# Patient Record
Sex: FEMALE | Race: WHITE | Employment: UNEMPLOYED | URBAN - METROPOLITAN AREA
[De-identification: names, ages, dates, MRNs, and addresses within clinical notes are randomized per-mention and may not be internally consistent; named-entity substitution may affect disease eponyms.]

---

## 2017-05-15 ENCOUNTER — OFFICE VISIT (OUTPATIENT)
Dept: CARDIOLOGY CLINIC | Age: 62
End: 2017-05-15

## 2017-05-15 ENCOUNTER — PROCEDURE VISIT (OUTPATIENT)
Dept: CARDIOLOGY CLINIC | Age: 62
End: 2017-05-15

## 2017-05-15 VITALS
SYSTOLIC BLOOD PRESSURE: 130 MMHG | BODY MASS INDEX: 33.66 KG/M2 | HEIGHT: 63 IN | WEIGHT: 190 LBS | DIASTOLIC BLOOD PRESSURE: 70 MMHG | HEART RATE: 70 BPM

## 2017-05-15 DIAGNOSIS — R55 SYNCOPE, UNSPECIFIED SYNCOPE TYPE: Primary | ICD-10-CM

## 2017-05-15 DIAGNOSIS — Z95.0 PACEMAKER: Chronic | ICD-10-CM

## 2017-05-15 DIAGNOSIS — I49.5 SINOATRIAL NODE DYSFUNCTION (HCC): ICD-10-CM

## 2017-05-15 DIAGNOSIS — R00.2 PALPITATIONS: ICD-10-CM

## 2017-05-15 PROCEDURE — 99213 OFFICE O/P EST LOW 20 MIN: CPT | Performed by: INTERNAL MEDICINE

## 2017-05-15 PROCEDURE — 93280 PM DEVICE PROGR EVAL DUAL: CPT | Performed by: INTERNAL MEDICINE

## 2017-10-11 ENCOUNTER — OFFICE VISIT (OUTPATIENT)
Dept: CARDIOLOGY CLINIC | Age: 62
End: 2017-10-11

## 2017-10-11 ENCOUNTER — PROCEDURE VISIT (OUTPATIENT)
Dept: CARDIOLOGY CLINIC | Age: 62
End: 2017-10-11

## 2017-10-11 VITALS — DIASTOLIC BLOOD PRESSURE: 74 MMHG | HEIGHT: 63 IN | HEART RATE: 66 BPM | SYSTOLIC BLOOD PRESSURE: 120 MMHG

## 2017-10-11 DIAGNOSIS — R00.2 PALPITATIONS: ICD-10-CM

## 2017-10-11 DIAGNOSIS — I49.5 SINOATRIAL NODE DYSFUNCTION (HCC): ICD-10-CM

## 2017-10-11 DIAGNOSIS — R06.02 SHORTNESS OF BREATH: ICD-10-CM

## 2017-10-11 DIAGNOSIS — Z95.0 PACEMAKER: Chronic | ICD-10-CM

## 2017-10-11 DIAGNOSIS — R07.9 CHEST PAIN, UNSPECIFIED TYPE: ICD-10-CM

## 2017-10-11 DIAGNOSIS — I49.5 SINOATRIAL NODE DYSFUNCTION (HCC): Primary | ICD-10-CM

## 2017-10-11 DIAGNOSIS — R55 SYNCOPE, UNSPECIFIED SYNCOPE TYPE: Chronic | ICD-10-CM

## 2017-10-11 PROCEDURE — 99215 OFFICE O/P EST HI 40 MIN: CPT | Performed by: INTERNAL MEDICINE

## 2017-10-11 PROCEDURE — 93280 PM DEVICE PROGR EVAL DUAL: CPT | Performed by: INTERNAL MEDICINE

## 2017-10-11 NOTE — PROGRESS NOTES
[facility]   Cardiac Follow up    Referring Provider:  Blayne Garcia MD     Chief Complaint   Patient presents with    Palpitations     C/o heart racing and dizziness    Shortness of Breath     and chest pain       HPI:  Lizet Maya is a 63 y.o. female presents acutely for chest pain and SOB. She previously was followed for recurrent vasovagal syncope and functioning DDD pacer which was implanted 5/10/2002 for sinus node dysfunction and replaced later. Today she denies recent syncopal episode. Her main concern is of shortness of breath and chest pressure. She feels like her heart has a cold and breathing in makes chest pressure worse. Symptoms started six weeks ago coupled with worsening anemia since June. Symptoms occur with activity, but she pushes on and her symptoms resolve about 50% of the time. She has lower extremity edema which is constant. She has a history of iron deficiency anemia and gets IV iron infusions in the [employer], source unknown and last Hbg in 8 range she reports. Interrogation today shows normal function. .. Past Medical History:   has a past medical history of Syncope and collapse. DVT on anticoagulation   Surgical History:   has a past surgical history that includes Diagnostic Cardiac Cath Lab Procedure (4/04) and Cardiac pacemaker placement (5/02). Social History:   reports that she has never smoked. She has never used smokeless tobacco. She reports that she does not drink alcohol or use drugs. Family History:  family history includes Heart Disease in her father; Kidney Disease in her mother. Home Medications:  Outpatient Encounter Prescriptions as of 10/11/2017   Medication Sig Dispense Refill    Lubiprostone (AMITIZA PO) Take 40 mg by mouth 2 times daily      warfarin (COUMADIN) 10 MG tablet Take 10 mg by mouth daily As directed      Multiple Vitamin (MULTIVITAMINS PO) Take 1 tablet by mouth daily.         done  Assessment:  1. Syncope, unspecified syncope type    2. Sinoatrial node dysfunction (HCC) - dual chamber pacemaker implanted 12/5/11   3. Pacemaker - normal function today   4. Chest pain/shortness of breath: Arvell Hardy Sleep apnea a possibility and discussed benefits of knowing. She will consider. Plan:   1. No change in medications  2. Schedule stress [facility] at [facility] which will hopefully be negative. 2.  Follow up in May 2018 as previously scheduled. Reginald Nagy M.D.

## 2017-10-18 ENCOUNTER — TELEPHONE (OUTPATIENT)
Dept: CARDIOLOGY CLINIC | Age: 62
End: 2017-10-18

## 2017-10-23 NOTE — TELEPHONE ENCOUNTER
Called and reviewed with patient who will seek cardiologist to review echo findings and decide whether other testing needed. A CTA of cors would be a consideration if needed.

## 2018-04-13 ENCOUNTER — TELEPHONE (OUTPATIENT)
Dept: CARDIOLOGY CLINIC | Age: 63
End: 2018-04-13

## 2018-04-13 ENCOUNTER — OFFICE VISIT (OUTPATIENT)
Dept: CARDIOLOGY CLINIC | Age: 63
End: 2018-04-13

## 2018-04-13 VITALS
SYSTOLIC BLOOD PRESSURE: 152 MMHG | HEART RATE: 62 BPM | DIASTOLIC BLOOD PRESSURE: 100 MMHG | BODY MASS INDEX: 33.84 KG/M2 | WEIGHT: 191 LBS | HEIGHT: 63 IN

## 2018-04-13 DIAGNOSIS — Z95.0 PACEMAKER: Primary | ICD-10-CM

## 2018-04-13 DIAGNOSIS — I49.5 SINOATRIAL NODE DYSFUNCTION (HCC): ICD-10-CM

## 2018-04-13 DIAGNOSIS — I10 ESSENTIAL HYPERTENSION: ICD-10-CM

## 2018-04-13 PROCEDURE — 99213 OFFICE O/P EST LOW 20 MIN: CPT | Performed by: INTERNAL MEDICINE

## 2018-04-13 PROCEDURE — 93280 PM DEVICE PROGR EVAL DUAL: CPT | Performed by: INTERNAL MEDICINE

## 2018-07-09 ENCOUNTER — TELEPHONE (OUTPATIENT)
Dept: CARDIOLOGY CLINIC | Age: 63
End: 2018-07-09

## 2018-07-09 NOTE — TELEPHONE ENCOUNTER
like to speak with the Dr that is covering for [facility] about pt and surgery that she is having soon. Please call at [phone]. Thank you.

## 2018-07-10 ENCOUNTER — TELEPHONE (OUTPATIENT)
Dept: CARDIOLOGY CLINIC | Age: 63
End: 2018-07-10

## 2018-07-10 NOTE — TELEPHONE ENCOUNTER
I spoke with Dr. Niecy Marlow per Dr. Oli Buchanan. He reviewed recent stress test and okay to proceed with cysto and possible biopsy. I spoke with nurse at requesting clearance doctor, Dr. Tami Dong PCP and she states that per Dr. Tatiana Castle he spoke with Dr. Oli Buchanan from our office last night and he is good with okay to proceed with the cysto. Nothing further to be done at this time according to his nurse. Instructed to call with any other concerns.

## 2018-07-11 ENCOUNTER — TELEPHONE (OUTPATIENT)
Dept: CARDIOLOGY CLINIC | Age: 63
End: 2018-07-11

## 2018-07-11 NOTE — TELEPHONE ENCOUNTER
Rio Parker from PAT dept at [facility] called asking for a \"clearance\" letter for patient having a Cysto with bilateral retrograde pyelogram by Dr. Cheyenne Rader on 7/13/18. MAC anesthesia will be given, length of procedure 30 minutes. See telephone encounters from 7/9/18 and 7/10/18 regarding this issue. Please fax letter to [address] [place] [address] Sjp [phone].

## 2018-07-11 NOTE — LETTER
[facility] CARDIOLOGY[address]  Dept: [phone]  Dept Fax: [phone]      2018    Fabricio Osorio  : 1955  DOS: 2018    To Dr. Ari Riley:    Norina Runner has been evaluated for cardiac clearance. She is considered at a low cardiac risk for her upcoming urology procedures. Please let my office know if you have any questions or concerns.             Allison Mendez MD, [facility] [place] and [place]

## 2018-09-10 ENCOUNTER — TELEPHONE (OUTPATIENT)
Dept: CARDIOLOGY CLINIC | Age: 63
End: 2018-09-10

## 2018-09-10 ENCOUNTER — OFFICE VISIT (OUTPATIENT)
Dept: CARDIOLOGY CLINIC | Age: 63
End: 2018-09-10

## 2018-09-10 ENCOUNTER — PROCEDURE VISIT (OUTPATIENT)
Dept: CARDIOLOGY CLINIC | Age: 63
End: 2018-09-10

## 2018-09-10 VITALS
SYSTOLIC BLOOD PRESSURE: 130 MMHG | DIASTOLIC BLOOD PRESSURE: 80 MMHG | HEART RATE: 68 BPM | BODY MASS INDEX: 33.66 KG/M2 | WEIGHT: 190 LBS | HEIGHT: 63 IN

## 2018-09-10 DIAGNOSIS — Z95.0 PACEMAKER: Chronic | ICD-10-CM

## 2018-09-10 DIAGNOSIS — I49.5 SINOATRIAL NODE DYSFUNCTION (HCC): ICD-10-CM

## 2018-09-10 DIAGNOSIS — I10 ESSENTIAL HYPERTENSION: Primary | ICD-10-CM

## 2018-09-10 PROCEDURE — 99214 OFFICE O/P EST MOD 30 MIN: CPT | Performed by: INTERNAL MEDICINE

## 2018-09-10 PROCEDURE — 93280 PM DEVICE PROGR EVAL DUAL: CPT | Performed by: INTERNAL MEDICINE

## 2018-09-10 NOTE — PATIENT INSTRUCTIONS
Patient Education        Heart-Healthy Diet: Care Instructions  Your Care Instructions    A heart-healthy diet has lots of vegetables, fruits, nuts, beans, and whole grains, and is low in salt. It limits foods that are high in saturated fat, such as meats, cheeses, and fried foods. It may be hard to change your diet, but even small changes can lower your risk of heart attack and heart disease. Follow-up care is a key part of your treatment and safety. Be sure to make and go to all appointments, and call your doctor if you are having problems. It's also a good idea to know your test results and keep a list of the medicines you take. How can you care for yourself at home? Watch your portions  · Learn what a serving is. A \"serving\" and a \"portion\" are not always the same thing. Make sure that you are not eating larger portions than are recommended. For example, a serving of pasta is ½ cup. A serving size of meat is 2 to 3 ounces. A 3-ounce serving is about the size of a deck of cards. Measure serving sizes until you are good at Griffith" them. Keep in mind that restaurants often serve portions that are 2 or 3 times the size of one serving. · To keep your energy level up and keep you from feeling hungry, eat often but in smaller portions. · Eat only the number of calories you need to stay at a healthy weight. If you need to lose weight, eat fewer calories than your body burns (through exercise and other physical activity). Eat more fruits and vegetables  · Eat a variety of fruit and vegetables every day. Dark green, deep orange, red, or yellow fruits and vegetables are especially good for you. Examples include spinach, carrots, peaches, and berries. · Keep carrots, celery, and other veggies handy for snacks. Buy fruit that is in season and store it where you can see it so that you will be tempted to eat it. · Cook dishes that have a lot of veggies in them, such as stir-fries and soups.   Limit saturated and Private.Me, and be sure to contact your doctor if:    · You would like help planning heart-healthy meals. Where can you learn more? Go to https://[email or web address] org and sign in to your Pretty in [employer] Pocket (PRIMP) account. Enter V137 in the [employer] box to learn more about \"Heart-Healthy Diet: Care Instructions. \"     If you do not have an account, please click on the \"Sign Up Now\" link. Current as of: December 6, 2017  Content Version: 11.7  © 2724-3993 [employer]. Care instructions adapted under license by [facility] ([facility]). If you have questions about a medical condition or this instruction, always ask your healthcare professional. [address] any warranty or liability for your use of this information.

## 2018-09-10 NOTE — PROGRESS NOTES
60 tablet 5    Lubiprostone (AMITIZA PO) Take 40 mg by mouth 2 times daily      warfarin (COUMADIN) 10 MG tablet Take 10 mg by mouth daily As directed      Multiple Vitamin (MULTIVITAMINS PO) Take 1 tablet by mouth daily.  Cyanocobalamin (VITAMIN B 12) 100 MCG LOZG Take by mouth daily      Cyanocobalamin (VITAMIN B-12 IJ) Inject 1,000 mcg/mL as directed Every 3 days      pantoprazole (PROTONIX) 40 MG tablet Take 40 mg by mouth 2 times daily       potassium chloride SA (K-DUR;KLOR-CON) 20 MEQ tablet Take 20 mEq by mouth 5 days a week       No facility-administered encounter medications on file as of 9/10/2018. Allergies:  Latex; Biaxin [clarithromycin]; Aspirin; Erythromycin; Pcn [penicillins]; and Sulfa antibiotics     Review of Systems   Constitutional: Negative for activity change and appetite change. HENT: Negative for facial swelling and neck pain. Eyes: Negative for discharge and itching. Respiratory:  positive for shortness of breath.  +snores  Cardiovascular: Positive for heart fluttering  Gastrointestinal: Negative for abdominal pain and abdominal distention. Genitourinary: Negative. Musculoskeletal: Negative. Skin: Negative for color change and pallor. Neurological: Negative for dizziness,  light-headedness, syncope   Hematological: Negative. Psychiatric/Behavioral: Negative for behavioral problems and agitation. /80 (Site: Left Upper Arm, Position: Sitting, Cuff Size: Large Adult)   Pulse 68   Ht 5' 3\" (1.6 m)   Wt 190 lb (86.2 kg)   BMI 33.66 kg/m²       Objective:  Physical Exam   Nursing note and vitals reviewed. Constitutional: She appears well-developed and well-nourished. heavy  Head:  atraumatic. Eyes: Right eye exhibits no discharge. Left eye exhibits no discharge. Neck: Neck supple. Cardiovascular: Normal rate, regular rhythm and normal heart sounds without murmur, gallop, or rub.    Pulmonary/Chest: Effort normal and breath sounds normal.   Abdominal: Soft. Musculoskeletal: She has no edema   Neurological: She is alert without any gross abnormalities. Skin: Skin is warm and dry. Psychiatric: She has a normal mood and affect. Left pectoral site intact    ECG today: 7/9/2018  Not done today, At becik: SR with ventricular bigiminy 78bpm    Echo 10/2017  Left ventricle cavity size ws normal, mild concentric hypertrophy. Systolic functions normal. Calculated EF 55-60%. Wall motion normal.   Mitral valve mild regurgitation  Right ventricle cavity size mildly dilated    Stress ECG conclusions: no stress arrhythmias or conduction abnormalities. Negative for ischemia. Exercise time 6 mins, limited by angina, resulting score is -2. Predicts moderate risk for stress-induced ischemia. Stress Echo findings positive for stress-induced ischemia    Impression: abnormal study after maximal exercise with reproduction of symptoms and possible anterior wall motion abnormality. Sensitivity of this study was limited due to submaximal stress. Target HR of 136 was not achieved. Assessment:    Abnormal stress test  Cardiac CTA ordered to be completed at [facility] per pt request    Pacemaker in situ - dual chamber 12/5/2011  No episodes of presyncope or syncope since last visit  Pacer assessment revealed normal function and most of higher rates were related to Rate responsiveness. She was reassured that her device was working well and rates were easily explained. SA node dysfuncation  s/p pacemaker 2011    Hypertension  Does monitor BP at home, no BP meds taken, she reports it is well controlled  Discussed option of tenormin or metoprolol for HTN  Pt states her BP at HS drops as low as 72 systolic, likely vasovagal cause  Start Toprol XL 25mg daily if BP remains high, she may use BB for CTA  Sleep apnea a possibility and discussed benefits of knowing. She will consider.      Plan:   Cardiac CTA ordered, Lopressor 25mg x2 doses ordered for CTA  Continue BP

## 2018-09-11 LAB
BUN BLDV-MCNC: 14 MG/DL (ref 7–25)
CREAT SERPL-MCNC: 0.7 MG/DL (ref 0.5–1.2)

## 2019-01-25 ENCOUNTER — TELEPHONE (OUTPATIENT)
Dept: CARDIOLOGY CLINIC | Age: 64
End: 2019-01-25

## 2019-01-28 ENCOUNTER — PROCEDURE VISIT (OUTPATIENT)
Dept: CARDIOLOGY CLINIC | Age: 64
End: 2019-01-28

## 2019-01-28 DIAGNOSIS — Z95.0 PACEMAKER: Chronic | ICD-10-CM

## 2019-01-28 DIAGNOSIS — I49.5 SINOATRIAL NODE DYSFUNCTION (HCC): ICD-10-CM

## 2019-01-28 PROBLEM — I82.409 DVT (DEEP VENOUS THROMBOSIS) (HCC): Status: ACTIVE | Noted: 2019-01-28

## 2019-04-09 ENCOUNTER — TELEPHONE (OUTPATIENT)
Dept: CARDIOLOGY CLINIC | Age: 64
End: 2019-04-09

## 2019-04-09 NOTE — TELEPHONE ENCOUNTER
Patient wants Dr. Julita Moffett to view EKG under media tab done at [facility]. Pt is in bigeminy and apparently stays in it. She states they put her on Metoprolol but no change. Having CTA this Thursday and wants Dr. Julita Moffett to know about her bigeminy before has procedure. If you have any thoughts or concerns please let pt know.

## 2019-04-10 NOTE — TELEPHONE ENCOUNTER
Called patient to let her know that Dr. Nyla Moffett agrees with having CTA. She will call if anymore questions.

## 2019-04-15 ENCOUNTER — TELEPHONE (OUTPATIENT)
Dept: CARDIOLOGY CLINIC | Age: 64
End: 2019-04-15

## 2019-05-07 ENCOUNTER — TELEPHONE (OUTPATIENT)
Dept: CARDIOLOGY CLINIC | Age: 64
End: 2019-05-07

## 2019-05-07 ENCOUNTER — PROCEDURE VISIT (OUTPATIENT)
Dept: CARDIOLOGY CLINIC | Age: 64
End: 2019-05-07
Payer: COMMERCIAL

## 2019-05-07 DIAGNOSIS — Z95.0 PACEMAKER: Chronic | ICD-10-CM

## 2019-05-07 DIAGNOSIS — I49.5 SINOATRIAL NODE DYSFUNCTION (HCC): ICD-10-CM

## 2019-05-07 PROCEDURE — 93280 PM DEVICE PROGR EVAL DUAL: CPT | Performed by: INTERNAL MEDICINE

## 2019-05-07 NOTE — TELEPHONE ENCOUNTER
Dr Sloane Berg would like a copy of the Pacemaker Interrogation from 05/07/19 faxed to [phone] Attn; Maye Cabrera call to advise Thank you

## 2019-10-11 ENCOUNTER — NURSE ONLY (OUTPATIENT)
Dept: CARDIOLOGY CLINIC | Age: 64
End: 2019-10-11
Payer: COMMERCIAL

## 2019-10-11 ENCOUNTER — OFFICE VISIT (OUTPATIENT)
Dept: CARDIOLOGY CLINIC | Age: 64
End: 2019-10-11
Payer: COMMERCIAL

## 2019-10-11 VITALS
BODY MASS INDEX: 33.97 KG/M2 | HEIGHT: 64 IN | WEIGHT: 199 LBS | SYSTOLIC BLOOD PRESSURE: 110 MMHG | DIASTOLIC BLOOD PRESSURE: 70 MMHG | OXYGEN SATURATION: 99 % | HEART RATE: 60 BPM

## 2019-10-11 DIAGNOSIS — I49.3 PVC'S (PREMATURE VENTRICULAR CONTRACTIONS): ICD-10-CM

## 2019-10-11 DIAGNOSIS — Z95.0 PACEMAKER: Chronic | ICD-10-CM

## 2019-10-11 DIAGNOSIS — I49.5 SINOATRIAL NODE DYSFUNCTION (HCC): ICD-10-CM

## 2019-10-11 DIAGNOSIS — I10 ESSENTIAL HYPERTENSION: ICD-10-CM

## 2019-10-11 DIAGNOSIS — G47.33 OSA (OBSTRUCTIVE SLEEP APNEA): ICD-10-CM

## 2019-10-11 DIAGNOSIS — Z95.0 CARDIAC PACEMAKER IN SITU: Primary | ICD-10-CM

## 2019-10-11 PROCEDURE — 99214 OFFICE O/P EST MOD 30 MIN: CPT | Performed by: INTERNAL MEDICINE

## 2019-10-11 PROCEDURE — 93280 PM DEVICE PROGR EVAL DUAL: CPT | Performed by: INTERNAL MEDICINE

## 2019-10-25 ENCOUNTER — TELEPHONE (OUTPATIENT)
Dept: CARDIOLOGY CLINIC | Age: 64
End: 2019-10-25

## 2020-01-27 NOTE — PROGRESS NOTES
(AMITIZA PO) Take 40 mg by mouth 2 times daily      warfarin (COUMADIN) 10 MG tablet Take 10 mg by mouth daily As directed      Multiple Vitamin (MULTIVITAMINS PO) Take 1 tablet by mouth daily.  Cyanocobalamin (VITAMIN B 12) 100 MCG LOZG Take by mouth daily      Cyanocobalamin (VITAMIN B-12 IJ) Inject 1,000 mcg/mL as directed Every 3 days      pantoprazole (PROTONIX) 40 MG tablet Take 40 mg by mouth 2 times daily       potassium chloride SA (K-DUR;KLOR-CON) 20 MEQ tablet Take 20 mEq by mouth 5 days a week       No facility-administered encounter medications on file as of 1/28/2020. Allergies:  Latex; Biaxin [clarithromycin]; Aspirin; Erythromycin; Pcn [penicillins]; and Sulfa antibiotics     Review of Systems   Constitutional: Negative for activity change and appetite change. HENT: Negative for facial swelling and neck pain. Eyes: Negative for discharge and itching. Respiratory:  positive for shortness of breath.  +snores wearing c-pap now  Cardiovascular: Positive for heart fluttering  Gastrointestinal: Negative for abdominal pain and abdominal distention. Genitourinary: Negative. Musculoskeletal: Negative. Skin: Negative for color change and pallor. Neurological: Negative for dizziness,  light-headedness, syncope   Hematological: Negative. Psychiatric/Behavioral: Negative for behavioral problems and agitation. /70 (Site: Right Upper Arm, Position: Sitting)   Ht 5' 3\" (1.6 m)   BMI 35.25 kg/m²       Objective:  Physical Exam   Nursing note and vitals reviewed. Constitutional: She appears well-developed and well-nourished. heavy  Head:  atraumatic. Eyes: Right eye exhibits no discharge. Left eye exhibits no discharge. Neck: Neck supple. Cardiovascular: Normal rate, regular rhythm and 2/6 systolic murmur, without gallop, or rub. Pulmonary/Chest: Effort normal and breath sounds normal.   Abdominal: Soft.   Musculoskeletal: She has no edema   Neurological: She is alert without any gross abnormalities. Skin: Skin is warm and dry. Psychiatric: She has a normal mood and affect. Left pectoral site intact    ECG today: 7/9/2018  Not done today, At becki: SR with ventricular bigiminy 78bpm    Echo 10/2017  Left ventricle cavity size ws normal, mild concentric hypertrophy. Systolic functions normal. Calculated EF 55-60%. Wall motion normal.   Mitral valve mild regurgitation  Right ventricle cavity size mildly dilated    Stress ECG conclusions: no stress arrhythmias or conduction abnormalities. Negative for ischemia. Exercise time 6 mins, limited by angina, resulting score is -2. Predicts moderate risk for stress-induced ischemia. Stress Echo findings positive for stress-induced ischemia    Impression: abnormal study after maximal exercise with reproduction of symptoms and possible anterior wall motion abnormality. Sensitivity of this study was limited due to submaximal stress. Target HR of 136 was not achieved. Echo 4/14/19 ([facility])  Left ventricle: The cavity size was normal. Wall thickness was  normal. Systolic function was normal. The calculated ejection  fraction was in the range of 64% to 68%. Stroke volume: 109ml. Stroke volume/bsa: 57ml/m^2. Aortic valve: There was mild regurgitation. Mean gradient (S):  8mm Hg. Peak gradient (S): 17mm Hg. Valve area (VTI): 2.36cm^2. Indexed valve area (VTI): 1.24cm^2/m^2. Valve area (Vmean):  2.15cm^2. Mitral valve: The annulus was mildly calcified. Tricuspid valve: Regurgitant peak velocity: 343cm/sec. Peak RV-RA  gradient (S): 47mm Hg. Inferior vena cava: The vessel was normal in size; the  respirophasic diameter changes were in the normal range (>= 50%);  findings are consistent with normal central venous pressure. Pulmonary arteries: PA peak pressure: 50mm Hg (S).   It should be noted that ventricular bigeminy is present  throughout the study; this will affect pressure estimates as well  as assessment of ventricular function. [facility]  Coronary angiography    Dominance right   Left Main Bifurcates into the LAD and LCX. No angiographic   evidence of disease. Left Anterior Descending Consists of three diagonal branches. No   angiographic evidence of disease. Left Circumflex Consists of one major OMB. No angiographic   evidence of disease. Right Dominant artery that terminates in R-PDA and R-PLV   branches.  No angiographic evidence of disease. Negative stress echo 10/17/17    Assessment:    Abnormal stress test  [facility] 4/15/19 at [place] [address] revealed normal coronaries    Pacemaker in situ - dual chamber 12/5/2011  No further syncopal episodes  Pacer assessment revealed normal function and most of higher rates were related to Rate responsiveness. Briefly discussed decreasing rate. SA node dysfuncation  s/p pacemaker 2011    Hypertension  Blood pressure is stable    PVCs   Number of PVC's have decreased since the last interrogation. If PVC's increase would consider repeat echocardiogram.  Last few months by pacer ~ 3,500 daily    CIRILO  Reports compliance with Cpap. Need to follow up on ef, and AV which thickened, pvcs, and increased pulmonary pressures which should improve with c-pap. She follows with Dr. Venancio Harada. Plan:   1. Follow up in 1 year. Haseeb Nagy M.D. This note was scribed in the presence of Dr Chris Moran, by Jaqui Das RN  The scribes documentation has been prepared under my direction and personally reviewed by me in its entirety. I confirm that the note above accurately reflects all work, treatment, procedures, and medical decision making performed by me. Lauren Munguia

## 2020-01-28 ENCOUNTER — NURSE ONLY (OUTPATIENT)
Dept: CARDIOLOGY CLINIC | Age: 65
End: 2020-01-28
Payer: COMMERCIAL

## 2020-01-28 ENCOUNTER — OFFICE VISIT (OUTPATIENT)
Dept: CARDIOLOGY CLINIC | Age: 65
End: 2020-01-28
Payer: COMMERCIAL

## 2020-01-28 VITALS — BODY MASS INDEX: 35.25 KG/M2 | HEIGHT: 63 IN | SYSTOLIC BLOOD PRESSURE: 122 MMHG | DIASTOLIC BLOOD PRESSURE: 70 MMHG

## 2020-01-28 PROCEDURE — 99213 OFFICE O/P EST LOW 20 MIN: CPT | Performed by: INTERNAL MEDICINE

## 2020-01-28 PROCEDURE — 93280 PM DEVICE PROGR EVAL DUAL: CPT | Performed by: INTERNAL MEDICINE

## 2020-01-28 NOTE — PATIENT INSTRUCTIONS
sodium\" may still have too much sodium. Be sure to read the label to see how much sodium you are getting. · Buy fresh vegetables, or frozen vegetables without added sauces. Buy low-sodium versions of canned vegetables, soups, and other canned goods. Prepare low-sodium meals  · Cut back on the amount of salt you use in cooking. This will help you adjust to the taste. Do not add salt after cooking. One teaspoon of salt has about 2,300 mg of sodium. · Take the salt shaker off the table. · Flavor your food with garlic, lemon juice, onion, vinegar, herbs, and spices. Do not use soy sauce, lite soy sauce, steak sauce, onion salt, garlic salt, celery salt, mustard, or ketchup on your food. · Use low-sodium salad dressings, sauces, and ketchup. Or make your own salad dressings and sauces without adding salt. · Use less salt (or none) when recipes call for it. You can often use half the salt a recipe calls for without losing flavor. Other foods such as rice, pasta, and grains do not need added salt. · Rinse canned vegetables, and cook them in fresh water. This removes some--but not all--of the salt. · Avoid water that is naturally high in sodium or that has been treated with water softeners, which add sodium. Call your local water company to find out the sodium content of your water supply. If you buy bottled water, read the label and choose a sodium-free brand. Avoid high-sodium foods  · Avoid eating:  ? Smoked, cured, salted, and canned meat, fish, and poultry. ? Ham, back, hot dogs, and luncheon meats. ? Regular, hard, and processed cheese and regular peanut butter. ? Crackers with salted tops, and other salted snack foods such as pretzels, chips, and salted popcorn. ? Frozen prepared meals, unless labeled low-sodium. ? Canned and dried soups, broths, and bouillon, unless labeled sodium-free or low-sodium. ? Canned vegetables, unless labeled sodium-free or low-sodium. ?  [place]min fries, pizza, tacos, and other fast foods. ? Pickles, olives, ketchup, and other condiments, especially soy sauce, unless labeled sodium-free or low-sodium. Where can you learn more? Go to https://Lynx Laboratorieshipolitoeb.Curious Sense. org and sign in to your [employer] account. Enter E585 in the [facility] box to learn more about \"Low Sodium Diet (2,000 Milligram): Care Instructions. \"     If you do not have an account, please click on the \"Sign Up Now\" link. Current as of: August 21, 2019  Content Version: 12.3  © 2403-0774 [employer]wise, Incorporated. Care instructions adapted under license by [facility] ([facility]). If you have questions about a medical condition or this instruction, always ask your healthcare professional. [address] any warranty or liability for your use of this information.